# Patient Record
Sex: FEMALE
[De-identification: names, ages, dates, MRNs, and addresses within clinical notes are randomized per-mention and may not be internally consistent; named-entity substitution may affect disease eponyms.]

---

## 2021-07-25 ENCOUNTER — NURSE TRIAGE (OUTPATIENT)
Dept: OTHER | Facility: CLINIC | Age: 46
End: 2021-07-25

## 2021-07-25 NOTE — TELEPHONE ENCOUNTER
b    Reason for Disposition   MILD rectal bleeding (more than just a few drops or streaks)   MODERATE rectal bleeding (small blood clots, passing blood without stool, or toilet water turns red)    Answer Assessment - Initial Assessment Questions  1. APPEARANCE of BLOOD: \"What color is it? \" \"Is it passed separately, on the surface of the stool, or mixed in with the stool? \"       Bright blood on the toilet paper when wiping    2. AMOUNT: \"How much blood was passed? \"       Less than a teaspoon    3. FREQUENCY: \"How many times has blood been passed with the stools? \"       Both times with a stool    4. ONSET: \"When was the blood first seen in the stools? \" (Days or weeks)       Yesterday    5. DIARRHEA: \"Is there also some diarrhea? \" If so, ask: \"How many diarrhea stools were passed in past 24 hours? \"       No    6. CONSTIPATION: \"Do you have constipation? \" If so, \"How bad is it? \"      No    7. RECURRENT SYMPTOMS: \"Have you had blood in your stools before? \" If so, ask: \"When was the last time? \" and \"What happened that time? \"       No    8. BLOOD THINNERS: \"Do you take any blood thinners? \" (e.g., Coumadin/warfarin, Pradaxa/dabigatran, aspirin)      Yes Warfarin    9. OTHER SYMPTOMS: \"Do you have any other symptoms? \"  (e.g., abdominal pain, vomiting, dizziness, fever)      None    10. PREGNANCY: \"Is there any chance you are pregnant? \" \"When was your last menstrual period? \"        No    Protocols used: RECTAL BLEEDING-ADULT-    Brief description of triage: is on warfarin and was concerned that she saw a drop of blood on the toilet tissue after having a stool. NO other symptoms are noted. Triage indicates for patient to follow up with PCP in 24 hours    Care advice provided, patient verbalizes understanding; denies any other questions or concerns; instructed to call back for any new or worsening symptoms. This triage is a result of a call to [address] [place] [address].  Please do not respond to the triage nurse through this encounter. Any subsequent communication should be directly with the patient.